# Patient Record
Sex: MALE | Race: ASIAN | NOT HISPANIC OR LATINO | Employment: FULL TIME | URBAN - METROPOLITAN AREA
[De-identification: names, ages, dates, MRNs, and addresses within clinical notes are randomized per-mention and may not be internally consistent; named-entity substitution may affect disease eponyms.]

---

## 2017-01-05 ENCOUNTER — RECORDS - HEALTHEAST (OUTPATIENT)
Dept: GENERAL RADIOLOGY | Facility: CLINIC | Age: 37
End: 2017-01-05

## 2017-01-05 ENCOUNTER — OFFICE VISIT - HEALTHEAST (OUTPATIENT)
Dept: FAMILY MEDICINE | Facility: CLINIC | Age: 37
End: 2017-01-05

## 2017-01-05 DIAGNOSIS — Z23 IMMUNIZATION DUE: ICD-10-CM

## 2017-01-05 DIAGNOSIS — R76.8 HEPATITIS C ANTIBODY TEST POSITIVE: ICD-10-CM

## 2017-01-05 DIAGNOSIS — M54.50 CHRONIC MIDLINE LOW BACK PAIN WITHOUT SCIATICA: ICD-10-CM

## 2017-01-05 DIAGNOSIS — G89.29 CHRONIC MIDLINE LOW BACK PAIN WITHOUT SCIATICA: ICD-10-CM

## 2017-01-05 DIAGNOSIS — G89.29 OTHER CHRONIC PAIN: ICD-10-CM

## 2017-01-05 DIAGNOSIS — K61.0 PERIANAL ABSCESS: ICD-10-CM

## 2017-01-05 DIAGNOSIS — M54.50 LOW BACK PAIN: ICD-10-CM

## 2017-01-05 ASSESSMENT — MIFFLIN-ST. JEOR: SCORE: 1484.98

## 2017-01-09 ENCOUNTER — RECORDS - HEALTHEAST (OUTPATIENT)
Dept: ADMINISTRATIVE | Facility: OTHER | Age: 37
End: 2017-01-09

## 2017-01-10 ENCOUNTER — RECORDS - HEALTHEAST (OUTPATIENT)
Dept: ADMINISTRATIVE | Facility: OTHER | Age: 37
End: 2017-01-10

## 2017-01-19 ENCOUNTER — COMMUNICATION - HEALTHEAST (OUTPATIENT)
Dept: FAMILY MEDICINE | Facility: CLINIC | Age: 37
End: 2017-01-19

## 2017-01-20 ENCOUNTER — OFFICE VISIT - HEALTHEAST (OUTPATIENT)
Dept: FAMILY MEDICINE | Facility: CLINIC | Age: 37
End: 2017-01-20

## 2017-01-20 DIAGNOSIS — Z01.818 PRE-OP EVALUATION: ICD-10-CM

## 2017-01-20 DIAGNOSIS — K61.0 PERIANAL ABSCESS: ICD-10-CM

## 2017-01-20 DIAGNOSIS — R76.8 HEPATITIS C ANTIBODY TEST POSITIVE: ICD-10-CM

## 2017-01-20 ASSESSMENT — MIFFLIN-ST. JEOR: SCORE: 1482.28

## 2017-01-23 ENCOUNTER — SURGERY - HEALTHEAST (OUTPATIENT)
Dept: SURGERY | Facility: HOSPITAL | Age: 37
End: 2017-01-23

## 2017-01-23 ENCOUNTER — ANESTHESIA - HEALTHEAST (OUTPATIENT)
Dept: SURGERY | Facility: HOSPITAL | Age: 37
End: 2017-01-23

## 2017-02-15 ENCOUNTER — RECORDS - HEALTHEAST (OUTPATIENT)
Dept: ADMINISTRATIVE | Facility: OTHER | Age: 37
End: 2017-02-15

## 2018-02-07 ENCOUNTER — OFFICE VISIT - HEALTHEAST (OUTPATIENT)
Dept: FAMILY MEDICINE | Facility: CLINIC | Age: 38
End: 2018-02-07

## 2018-02-07 DIAGNOSIS — R76.8 HEPATITIS C ANTIBODY TEST POSITIVE: ICD-10-CM

## 2018-02-07 DIAGNOSIS — Z28.39 IMMUNIZATION DEFICIENCY: ICD-10-CM

## 2018-02-07 ASSESSMENT — MIFFLIN-ST. JEOR: SCORE: 1482.43

## 2021-05-30 VITALS — HEIGHT: 63 IN | WEIGHT: 154 LBS | BODY MASS INDEX: 27.29 KG/M2

## 2021-05-30 VITALS — WEIGHT: 153.19 LBS | HEIGHT: 63 IN | BODY MASS INDEX: 27.14 KG/M2

## 2021-05-31 VITALS — WEIGHT: 153.5 LBS | BODY MASS INDEX: 27.2 KG/M2 | HEIGHT: 63 IN

## 2021-06-08 NOTE — PROGRESS NOTES
Assessment/Plan:      Visit for Preoperative Exam.    1. Pre-op evaluation     2. Perianal abscess     3. Hepatitis C antibody test positive        Hep c antibody positive but RNA undetected.  Likely cleared infection.  Discussed NPO after midnight.  No ibuprofen    Patient approved for surgery with general or local anesthesia. Postoperative pain to be managed by surgeon during post-operative [employer] timeframe, typically 30-60 days for major surgery, and less for others. Above recommendations were reviewed with the patient. Copy of the pre-op was given to the patient to bring along on the day of surgery.     Subjective:   Jumana Butterfield is a 37 y.o. male with  has had perianal abcess for the last two years.  Has had the abcess drained once in [place] and once in November 2016. And again on 1/17/17. He has continued to have drainage    Scheduled Procedure: I & D of abscess  Surgery Date:  1/23/17  Surgery Location:  [facility]  Surgeon:  Dr. Dashawn Alvarez    Current Outpatient Prescriptions on File Prior to Visit   Medication Sig     [DISCONTINUED] HYDROcodone-acetaminophen 5-325 mg per tablet      [DISCONTINUED] naproxen (NAPROSYN) 500 MG tablet      [DISCONTINUED] praziquantel (BILTRICIDE) 600 mg tablet 2 tabs po tid for one day     No current facility-administered medications on file prior to visit.        No Known Allergies    Immunization History   Administered Date(s) Administered     Hep B, Adult 01/05/2017     Hep B, historic 06/08/2016, 07/14/2016     Hepatitis A: Immune 11/25/2016     Hepatitis B: Immune 11/25/2016     Influenza, inj, historic 11/17/2016     MMR 06/08/2016, 07/14/2016     Td, historic 06/08/2016, 07/14/2016     Varicella: Immune 11/25/2016       Patient Active Problem List   Diagnosis     Perianal abscess     Hepatitis C antibody test positive     Chronic midline low back pain without sciatica       Past Medical History   Diagnosis Date     Perianal abscess        Social  History     Social History     Marital status:      Spouse name: N/A     Number of children: N/A     Years of education: N/A     Occupational History     Not on file.     Social History Main Topics     Smoking status: Current Every Day Smoker     Types: Cigarettes     Smokeless tobacco: Never Used      Comment: 2-3 per day     Alcohol use 1.8 - 2.4 oz/week     3 - 4 Cans of beer per week     Drug use: No     Sexual activity: Yes     Partners: Female     Other Topics Concern     Not on file     Social History Narrative       History reviewed. No pertinent past surgical history.    History of Present Illness  Recent Health  Fever: no  Chills: no  Fatigue: no  Chest Pain: no  Cough: no  Dyspnea: no  Urinary Frequency: no  Nausea: yes  Vomiting: no  Diarrhea: no  Abdominal Pain: no  Easy Bruising: no  Lower Extremity Swelling: no  Poor Exercise Tolerance: no    Most recent Health Maintenance Visit:  2 month(s) ago    Pertinent History  Prior Anesthesia: no  Previous Anesthesia Reaction:  no  Diabetes: no  Cardiovascular Disease: no  Pulmonary Disease: no  Renal Disease: no  GI Disease: no  Sleep Apnea: no  Thromboembolic Problems: no  Clotting Disorder: no  Bleeding Disorder: no  Transfusion Reaction: no  Impaired Immunity: no  Steroid use in the last 6 months: no  Frequent Aspirin use: no    Family history of sudden death    Social history of patient does not wear denture or partial plates, there is no transfusion refusal and there are no concerns regarding care after surgery    After surgery, the patient plans to recover at home with family.    Review of Systems  A 12 point comprehensive review of systems was negative except as noted.           Objective:           Physical Exam:      Vitals:    01/20/17 1403   BP: 126/80   Pulse: 79   Resp: 16   Temp: 98.8  F (37.1  C)   SpO2: 99%       GEN:  NAD, cooperative  MS:  A&O, affect normal, speech normal  HEENT:  Conjunctiva clear.  Sclera anicteric.  Nares clear.   Oropharynx clear without erythema or exudate.  TMs and EACs normal.  NECK:  supple, no lymphadenopathy or thyromegaly  CV:  S1S2 RRR, no M/R/G  RESP:  CTA bilaterally  ABD: +BS, soft, nontender, nondistended, No organomegaly   EXT:  Warm and dry, no edema   NEUROLOGIC:  PERRLA.  A & O x 3.  No tremor, no focal findings.  Normal gait.    SKIN:  No rashes or lesions.          Lab Review   Results for orders placed or performed in visit on 12/08/16   Hepatitis C Virus (HCV) RNA Detection and Quantification by RT-PCR   Result Value Ref Range    HCV RNA Detect/Quant Undetected Undetected IU/mL

## 2021-06-08 NOTE — ANESTHESIA CARE TRANSFER NOTE
Last vitals:   Vitals:    01/23/17 1751   BP: 152/74   Pulse: 83   Resp: 16   Temp: 36.8  C (98.2  F)   SpO2: 100%     Patient's level of consciousness is drowsy  Spontaneous respirations: yes  Maintains airway independently: yes  Dentition unchanged: yes  Oropharynx: oropharynx clear of all foreign objects    QCDR Measures:  ASA# 20 - Surgical Safety Checklist: ASA20A - Safety Checks Done  PQRS# 430 - Adult PONV Prevention: 4558F - Pt received => 2 anti-emetic agents (different classes) preop & intraop  ASA# 8 - Peds PONV Prevention: NA - Not pediatric patient, not GA or 2 or more risk factors NOT present  PQRS# 424 - Sakshi-op Temp Management: 4559F - At least one body temp DOCUMENTED => 35.5C or 95.9F within required timeframe  PQRS# 426 - PACU Transfer Protocol: - Transfer of care checklist used  ASA# 14 - Acute Post-op Pain: ASA14B - Patient did NOT experience pain >= 7 out of 10    I completed my SBAR handoff to the receiving nurse per policy and procedure.To PACU,awake,no moaning or groaning,no C/O pain,nausea,Iv patent ,infusing,VSS

## 2021-06-08 NOTE — ANESTHESIA POSTPROCEDURE EVALUATION
Patient: Jumana Scout  EXAM UNDER ANESTHESIA WITH DRAINAGE OF PERIANAL ABSCESS; FISTULATOMY  Anesthesia type: general    Patient location: PACU  Last vitals:   Vitals:    01/23/17 1850   BP:    Pulse: 68   Resp: 24   Temp:    SpO2: 95%     Post vital signs: stable  Level of consciousness: awake and responds to simple questions  Post-anesthesia pain: pain controlled  Post-anesthesia nausea and vomiting: no  Pulmonary: unassisted, return to baseline  Cardiovascular: stable and blood pressure at baseline  Hydration: adequate  Anesthetic events: no    QCDR Measures:  ASA# 11 - Sakshi-op Cardiac Arrest: ASA11B - Patient did NOT experience unanticipated cardiac arrest  ASA# 12 - Sakshi-op Mortality Rate: ASA12B - Patient did NOT die  ASA# 13 - PACU Re-Intubation Rate: ASA13B - Patient did NOT require a new airway mgmt  ASA# 10 - Composite Anes Safety: ASA10A - No serious adverse event  ASA# 38 - New Corneal Injury: ASA38A - No new exposure keratitis or corneal abrasion in PACU    Additional Notes:
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
## 2021-06-08 NOTE — ANESTHESIA PREPROCEDURE EVALUATION
Anesthesia Evaluation      Patient summary reviewed   No history of anesthetic complications     Airway   Mallampati: II  Neck ROM: full   Pulmonary - normal exam   (+) a smoker                         Cardiovascular - negative ROS and normal exam  Exercise tolerance: > or = 4 METS   Neuro/Psych    (+) seizures (Childhood febrile Sz) well controlled, chronic pain (Chronic back pain)    Endo/Other - negative ROS      GI/Hepatic/Renal    (-) GERD    Comments: Hx Hep C positive          Dental - normal exam                        Anesthesia Plan  Planned anesthetic: general endotracheal  Soft molar bite block  ASA 2   Induction: intravenous   Anesthetic plan and risks discussed with: patient and  services used  Anesthesia plan special considerations: antiemetics,   Post-op plan: routine recovery

## 2021-06-15 NOTE — PROGRESS NOTES
ASSESMENT AND PLAN:  Diagnoses and all orders for this visit:    Hepatitis C antibody test positive  Reviewed lab results today with the patient with the help of a professional .  he had a hepatitis C antibody test positive in the past but the RNA detection was undetectable, therefore he either cleared the virus or had a false positive antibody screen.  he was counseled on this today.    Immunization deficiency  -     Tdap vaccine greater than or equal to 8yo IM  -     Influenza, Seasonal,Quad Inj, 36+ MOS  Green Card/update imm.  Counseling done on immunization history and requirements with the patient.  Reviewed available immunization history and relevant lab records with patient and family.  Immunizations given as documented in the EHR and on form.  Acetaminophen as needed for post-immunization fever or myalgias.  [employer] form I-693 completed today with the patient.  Given to patient in a sealed labeled envelope and a copy is being scanned into the EHR.  Please see that form for further details.  Patient directed to follow-up in clinic for routine and preventative care.             SUBJECTIVE: 38-year-old male here for green card exam.  He has a history of a hepatitis C positive antibody test.  He has not been having any abdominal pain or jaundice.  No recent fevers, no history of immunization reactions or problems.  Patient had surgery for abscess one year ago and had a full recovery, he has not had any follow-up since that time.    Past Medical History:   Diagnosis Date     Perianal abscess      Patient Active Problem List   Diagnosis     Perianal abscess     Hepatitis C antibody test positive - undetectable RNA     Chronic midline low back pain without sciatica     Current Outpatient Prescriptions   Medication Sig Dispense Refill     acetaminophen (TYLENOL) 325 MG tablet Take 650 mg by mouth every 6 (six) hours as needed for pain.       psyllium husk, with sugar,  "(FIBER, PSYLLIUM HUSK/SUGAR,) 3.4 gram/12 gram Powd Take 12 g by mouth 2 (two) times a day. 12 grams equals 1 Tablespoon  0     No current facility-administered medications for this visit.      History   Smoking Status     Current Every Day Smoker     Types: Cigarettes   Smokeless Tobacco     Never Used     Comment: 2-3 per day       OBJECTICE: /70 (Patient Site: Left Arm, Patient Position: Sitting, Cuff Size: Adult Regular)  Pulse 72  Temp 98.5  F (36.9  C) (Oral)   Resp 24  Ht 5' 2.75\" (1.594 m)  Wt 153 lb 8 oz (69.6 kg)  BMI 27.41 kg/m2     No results found for this or any previous visit (from the past 24 hour(s)).     GEN-alert, appropriate, in no apparent distress   CV-get rate and rhythm with no murmur   RESP-lungs clear to auscultation   ABDOMINAL-soft and nontender with no palpable mass organomegaly       Dominguez Griffith          "

## 2024-01-07 ENCOUNTER — HOSPITAL ENCOUNTER (EMERGENCY)
Facility: HOSPITAL | Age: 44
Discharge: HOME | End: 2024-01-07
Attending: EMERGENCY MEDICINE | Admitting: EMERGENCY MEDICINE
Payer: COMMERCIAL

## 2024-01-07 ENCOUNTER — APPOINTMENT (OUTPATIENT)
Dept: CT IMAGING | Facility: HOSPITAL | Age: 44
End: 2024-01-07
Attending: EMERGENCY MEDICINE
Payer: COMMERCIAL

## 2024-01-07 VITALS
WEIGHT: 159.7 LBS | HEART RATE: 62 BPM | SYSTOLIC BLOOD PRESSURE: 168 MMHG | TEMPERATURE: 98.3 F | OXYGEN SATURATION: 100 % | RESPIRATION RATE: 20 BRPM | BODY MASS INDEX: 28.52 KG/M2 | DIASTOLIC BLOOD PRESSURE: 93 MMHG

## 2024-01-07 DIAGNOSIS — S16.1XXA CERVICAL STRAIN, INITIAL ENCOUNTER: ICD-10-CM

## 2024-01-07 PROCEDURE — 250N000013 HC RX MED GY IP 250 OP 250 PS 637: Performed by: EMERGENCY MEDICINE

## 2024-01-07 PROCEDURE — 72125 CT NECK SPINE W/O DYE: CPT

## 2024-01-07 PROCEDURE — 99284 EMERGENCY DEPT VISIT MOD MDM: CPT | Mod: 25

## 2024-01-07 RX ORDER — CYCLOBENZAPRINE HCL 10 MG
10 TABLET ORAL ONCE
Status: COMPLETED | OUTPATIENT
Start: 2024-01-07 | End: 2024-01-07

## 2024-01-07 RX ORDER — CYCLOBENZAPRINE HCL 10 MG
10 TABLET ORAL 3 TIMES DAILY PRN
Qty: 15 TABLET | Refills: 0 | Status: SHIPPED | OUTPATIENT
Start: 2024-01-07 | End: 2024-01-12

## 2024-01-07 RX ORDER — NAPROXEN 500 MG/1
500 TABLET ORAL 2 TIMES DAILY WITH MEALS
Qty: 16 TABLET | Refills: 0 | Status: SHIPPED | OUTPATIENT
Start: 2024-01-07 | End: 2024-01-15

## 2024-01-07 RX ORDER — NAPROXEN 250 MG/1
500 TABLET ORAL ONCE
Status: COMPLETED | OUTPATIENT
Start: 2024-01-07 | End: 2024-01-07

## 2024-01-07 RX ADMIN — NAPROXEN 500 MG: 250 TABLET ORAL at 12:17

## 2024-01-07 RX ADMIN — CYCLOBENZAPRINE HYDROCHLORIDE 10 MG: 10 TABLET, FILM COATED ORAL at 12:16

## 2024-01-07 ASSESSMENT — ACTIVITIES OF DAILY LIVING (ADL): ADLS_ACUITY_SCORE: 35

## 2024-01-07 NOTE — ED PROVIDER NOTES
EMERGENCY DEPARTMENT ENCOUNTER      NAME: Jumana Butterfield  AGE: 44 year old male  YOB: 1980  MRN: [id number]  EVALUATION DATE & TIME: 2024 11:49 AM    PCP: No primary care provider on file.    ED PROVIDER: Scottie Rust M.D.      Chief Complaint   Patient presents with    Shoulder Pain         FINAL IMPRESSION:  1. Cervical strain, initial encounter          ED COURSE & MEDICAL DECISION MAKIN:01 PM I met with the patient, obtained history, performed physical exam, and discussed ED plan.  1:23 PM I rechecked on the patient. Repeat exam is benign. We discussed findings the plan for discharge with close follow-up and the patient is agreeable. Reviewed supportive cares, symptomatic treatment, outpatient follow up, and reasons to return to the Emergency Department. Patient to be discharged by ED RN.     Pertinent Labs & Imaging studies reviewed. (See chart for details)  44 year old male presents to the Emergency Department for evaluation of shoulder pain. Patient appears non toxic with stable vitals signs, patient afebrile with no tachycardia or hypoxia, no increased work of breathing.  Lungs are clear and abdomen is benign.  Patient does not report any fevers, falls or trauma, there is no skin changes or rashes, he has a GCS of 15 with no focal neurodeficits.  Per review of the medical record did review office visit from 7/3/2019 patient seen at [facility] occupational medicine for initial Worker's Compensation visit.  Was being seen at that time for left hand pain.  States that he has a very demanding physical job and was doing lots of physical work and that is when he noted the pain.  Again denies any falls or trauma.  Considered but overall very low suspicion for cervical fracture or dislocation, more so concern for cervical radiculopathy.  Clinically, nothing to suggest meningitis, subarachnoid hemorrhage, spinal cord compromise, shingles, septic joint, epidural bleed or  abscess, discitis, osteomyelitis, transverse myelitis or other more malicious etiology of symptoms.  Considered MRI imaging but the patient has no focal neurodeficits or trauma.  We will obtain CT imaging of the cervical spine patient was given pain medications and muscle relaxer.    Reassessment: Imaging studies by my independent interpretation showed no acute concerning findings, by report showed no fracture or posttraumatic subluxation.  Did note in report a periapical lucency involving the left second maxillary molar, suspect possibly a cavity, certainly nothing acute here.  Repeat exam the patient is benign and he appears quite well and comfortable.  Will discharge on a prescription of Flexeril and naproxen, advised no other NSAIDs while taking naproxen will recommend he follow-up with his primary care provider or our spine care center in the next 5 to 7 days for continued outpatient management evaluation.  Will recommend he follow-up with the outpatient dentist in regards to his.  Focal lucency.  Discussed all these findings recommendations with patient and they felt reassured and comfortable discharge.  Return precautions provided.    Medical Decision Making  Obtained supplemental history:Supplemental history obtained?: Documented in chart  Reviewed external records: External records reviewed?: No  Care impacted by chronic illness:N/A  Care significantly affected by social determinants of health:N/A  Did you consider but not order tests?: Work up considered but not performed and documented in chart, if applicable  Did you interpret images independently?: Independent interpretation of ECG and images noted in documentation, when applicable.  Consultation discussion with other provider:Did you involve another provider (consultant, MH, pharmacy, etc.)?: No  Discharge. I prescribed additional prescription strength medication(s) as charted. See documentation for any additional details.      At the conclusion of the  encounter I discussed the results of all of the tests and the disposition. The questions were answered and return precautions provided. The patient or family acknowledged understanding and was agreeable with the care plan.         MEDICATIONS GIVEN IN THE EMERGENCY:  Medications   naproxen (NAPROSYN) tablet 500 mg (500 mg Oral $Given 1/7/24 1217)   cyclobenzaprine (FLEXERIL) tablet 10 mg (10 mg Oral $Given 1/7/24 1216)       NEW PRESCRIPTIONS STARTED AT TODAY'S ER VISIT  Discharge Medication List as of 1/7/2024  2:21 PM        START taking these medications    Details   cyclobenzaprine (FLEXERIL) 10 MG tablet Take 1 tablet (10 mg) by mouth 3 times daily as needed for muscle spasms, Disp-15 tablet, R-0, Local Print      !! naproxen (NAPROSYN) 500 MG tablet Take 1 tablet (500 mg) by mouth 2 times daily (with meals) for 8 days, Disp-16 tablet, R-0, Local Print       !! - Potential duplicate medications found. Please discuss with provider.               =================================================================    HPI    Patient information was obtained from: patient, wife    Use of Intrepreter: Yes ([employer]) - Language Jaimie Butterfield is a 44 year old male who presents shoulder pain. On Friday 1/5/24, the patient was doing some lifting at work. He turned and felt a crack in the back of his neck. The pain then radiated down to his lower back and to his left shoulder. The pain is palliated by laying down. The patient last took 2 doses of paracetamol for pain last night. Denies fall/trauma, or any other complaints at this time.      REVIEW OF SYSTEMS   Constitutional:  Denies fever, chills  Respiratory:  Denies productive cough or increased work of breathing  Cardiovascular:  Denies chest pain, palpitations  GI:  Denies abdominal pain, nausea, vomiting, or change in bowel or bladder habits   Musculoskeletal:  Denies any new muscle/joint swelling. Neck pain and back pain  Skin:  Denies rash   Neurologic:   Denies focal weakness  All systems negative except as marked.     PAST MEDICAL HISTORY:  Past Medical History:   Diagnosis Date    NO ACTIVE PROBLEMS        PAST SURGICAL HISTORY:  Past Surgical History:   Procedure Laterality Date    ABCESS DRAINAGE      INCISION AND DRAINAGE PERIRECTAL ABSCESS N/A 1/23/2017    Procedure: EXAM UNDER ANESTHESIA WITH DRAINAGE OF PERIANAL ABSCESS; FISTULATOMY;  Surgeon: Obey Alvarez MD;  Location: [facility];  Service:          CURRENT MEDICATIONS:    Prior to Admission medications    Medication Sig Start Date End Date Taking? Authorizing Provider   HYDROcodone-acetaminophen (NORCO) 5-325 MG per tablet Take 1-2 tablets by mouth every 4 hours as needed for moderate to severe pain maximum 6 tablet(s) per day 11/11/16   Maurice Travis MD   naproxen (NAPROSYN) 500 MG tablet Take 1 tablet (500 mg) by mouth 2 times daily (with meals) 11/11/16   Maurice Travis MD        ALLERGIES:  No Known Allergies    FAMILY HISTORY:  Family History   Problem Relation Age of Onset    Diabetes No family hx of     Coronary Artery Disease No family hx of     Hypertension No family hx of     Breast Cancer No family hx of     Colon Cancer No family hx of     Prostate Cancer No family hx of     Other Cancer No family hx of     Asthma No family hx of     Anemia Son        SOCIAL HISTORY:   Social History     Socioeconomic History    Marital status:    Tobacco Use    Smoking status: Every Day     Types: Cigarettes    Smokeless tobacco: Never    Tobacco comments:     2-3 per day   Substance and Sexual Activity    Alcohol use: Yes     Alcohol/week: 3.0 - 4.0 standard drinks of alcohol    Drug use: No    Sexual activity: Yes     Partners: Female       VITALS:  Patient Vitals for the past 24 hrs:   BP Temp Pulse Resp SpO2 Weight   01/07/24 1400 (!) 168/93 -- 62 -- 100 % --   01/07/24 1330 (!) 158/101 -- 61 -- 99 % --   01/07/24 1132 (!) 178/99 98.3  F (36.8  C) 69 20 100 % 72.4 kg  (159 lb 11.2 oz)        PHYSICAL EXAM    Constitutional:  Awake, alert, in no apparent distress  HENT:  Normocephalic, Atraumatic. Bilateral external ears normal. Oropharynx moist. Nose normal. Neck- Normal range of motion with no guarding, No midline cervical tenderness but tenderness along the left lateral neck and into the left trapezius distribution, Supple, No stridor.   Eyes:  PERRL, EOMI with no signs of entrapment, Conjunctiva normal, No discharge.   Respiratory:  Normal breath sounds, No respiratory distress, No wheezing.    Cardiovascular:  Normal heart rate, Normal rhythm, No appreciable rubs or gallops.   GI:  Soft, No tenderness, No distension, No palpable masses  Musculoskeletal:  Intact distal pulses, No edema. Good range of motion in all major joints. No tenderness to palpation or major deformities noted.  Back-nontender along midline cervical, thoracic and lumbar spine with no step-offs or signs of trauma.  Integument:  Warm, Dry, No erythema, No rash or vesicular lesions  Neurologic:  Alert & oriented, Normal motor function, Normal sensory function, No focal deficits noted.  Normal sensation bilateral radial, ulnar median nerve distributions.  Psychiatric:  Affect normal, Judgment normal, Mood normal.       LAB:  All pertinent labs reviewed and interpreted.  Results for orders placed or performed during the hospital encounter of 01/07/24   CT Cervical Spine w/o Contrast    Impression    IMPRESSION:  1.  No fracture or posttraumatic subluxation.  2.  Multilevel spondylosis, as described.  3.  A periapical lucency involving the left second maxillary molar.       RADIOLOGY:  CT Cervical Spine w/o Contrast   Final Result   IMPRESSION:   1.  No fracture or posttraumatic subluxation.   2.  Multilevel spondylosis, as described.   3.  A periapical lucency involving the left second maxillary molar.             EKG:      I have independently reviewed and interpreted the EKG(s) documented  above.    PROCEDURES:        [facility] Documentation:     I, Brennon Head, am serving as a scribe to document services personally performed by Scottie Rust MD, based on my observation and the provider's statements to me. I, Scottie Rust MD attest that Brennon Head is acting in a scribe capacity, has observed my performance of the services and has documented them in accordance with my direction.    Scottie Rust M.D.  Emergency Medicine  [facility] EMERGENCY DEPARTMENT  [address] [place] [id number]  [phone]  Dept: [phone]     Scottie Rust MD  01/07/24 2778

## 2024-01-07 NOTE — DISCHARGE INSTRUCTIONS
Take the naproxen as prescribed, do not take other NSAIDs such as ibuprofen or Motrin while taking naproxen.  Take the Flexeril as prescribed.  Follow-up with your primary dentist in regards to the reports of this periapical lucency for continued outpatient management.  Please return for any worsening or more concerning symptoms.

## 2024-01-07 NOTE — ED TRIAGE NOTES
Patient presents here for evaluation of left shoulder/scapular/trapezius pain that has persisted over the past two days, beginning on Friday after his shift at work. 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 He is holding his arm above his head, which seems to reduce his pain. Dropping his arm to his side increases the pain. OTC analgesics not effective for pain relief.

## 2024-01-10 ENCOUNTER — TELEPHONE (OUTPATIENT)
Dept: ANESTHESIOLOGY | Facility: CLINIC | Age: 44
End: 2024-01-10
Payer: COMMERCIAL
